# Patient Record
Sex: FEMALE | URBAN - METROPOLITAN AREA
[De-identification: names, ages, dates, MRNs, and addresses within clinical notes are randomized per-mention and may not be internally consistent; named-entity substitution may affect disease eponyms.]

---

## 2017-08-17 ENCOUNTER — HOSPITAL ENCOUNTER (OUTPATIENT)
Dept: GENERAL RADIOLOGY | Age: 37
Discharge: HOME | End: 2017-08-17
Payer: COMMERCIAL

## 2017-08-17 DIAGNOSIS — J20.9 ACUTE BRONCHITIS: ICD-10-CM

## 2017-08-17 PROCEDURE — 71020 XR CHEST PA LAT: CPT

## 2023-10-31 ENCOUNTER — APPOINTMENT (OUTPATIENT)
Dept: SLEEP MEDICINE | Facility: CLINIC | Age: 43
End: 2023-10-31

## 2024-01-11 ENCOUNTER — LAB (OUTPATIENT)
Dept: LAB | Facility: LAB | Age: 44
End: 2024-01-11
Payer: COMMERCIAL

## 2024-01-11 ENCOUNTER — OFFICE VISIT (OUTPATIENT)
Dept: SLEEP MEDICINE | Facility: CLINIC | Age: 44
End: 2024-01-11
Payer: COMMERCIAL

## 2024-01-11 VITALS
TEMPERATURE: 98.1 F | HEIGHT: 64 IN | OXYGEN SATURATION: 99 % | BODY MASS INDEX: 21.17 KG/M2 | DIASTOLIC BLOOD PRESSURE: 71 MMHG | SYSTOLIC BLOOD PRESSURE: 104 MMHG | RESPIRATION RATE: 16 BRPM | HEART RATE: 76 BPM | WEIGHT: 124 LBS

## 2024-01-11 DIAGNOSIS — Z79.899 MEDICATION MANAGEMENT: Primary | ICD-10-CM

## 2024-01-11 DIAGNOSIS — J30.2 SEASONAL ALLERGIES: ICD-10-CM

## 2024-01-11 DIAGNOSIS — G47.419 NARCOLEPSY WITHOUT CATAPLEXY (HHS-HCC): ICD-10-CM

## 2024-01-11 DIAGNOSIS — Z79.899 MEDICATION MANAGEMENT: ICD-10-CM

## 2024-01-11 DIAGNOSIS — G47.33 OBSTRUCTIVE SLEEP APNEA: ICD-10-CM

## 2024-01-11 LAB
AMPHETAMINES UR QL SCN: NORMAL
BARBITURATES UR QL SCN: NORMAL
BENZODIAZ UR QL SCN: NORMAL
BZE UR QL SCN: NORMAL
CANNABINOIDS UR QL SCN: NORMAL
FENTANYL+NORFENTANYL UR QL SCN: NORMAL
OPIATES UR QL SCN: NORMAL
OXYCODONE+OXYMORPHONE UR QL SCN: NORMAL
PCP UR QL SCN: NORMAL

## 2024-01-11 PROCEDURE — 1036F TOBACCO NON-USER: CPT | Performed by: INTERNAL MEDICINE

## 2024-01-11 PROCEDURE — 80307 DRUG TEST PRSMV CHEM ANLYZR: CPT

## 2024-01-11 PROCEDURE — 99204 OFFICE O/P NEW MOD 45 MIN: CPT | Performed by: INTERNAL MEDICINE

## 2024-01-11 PROCEDURE — 99214 OFFICE O/P EST MOD 30 MIN: CPT | Performed by: INTERNAL MEDICINE

## 2024-01-11 RX ORDER — SODIUM OXYBATE 0.5 G/ML
500 SOLUTION ORAL
COMMUNITY

## 2024-01-11 RX ORDER — CYCLOSPORINE 0.5 MG/ML
1 EMULSION OPHTHALMIC 2 TIMES DAILY
COMMUNITY
Start: 2023-05-04

## 2024-01-11 ASSESSMENT — SLEEP AND FATIGUE QUESTIONNAIRES
HOW LIKELY ARE YOU TO NOD OFF OR FALL ASLEEP WHILE SITTING QUIETLY AFTER LUNCH WITHOUT ALCOHOL: SLIGHT CHANCE OF DOZING
HOW LIKELY ARE YOU TO NOD OFF OR FALL ASLEEP WHEN YOU ARE A PASSENGER IN A CAR FOR AN HOUR WITHOUT A BREAK: SLIGHT CHANCE OF DOZING
SITING INACTIVE IN A PUBLIC PLACE LIKE A CLASS ROOM OR A MOVIE THEATER: SLIGHT CHANCE OF DOZING
WAKING_TOO_EARLY: MILD
SLEEP_PROBLEM_NOTICEABLE_TO_OTHERS: NOT AT ALL NOTICEABLE
HOW LIKELY ARE YOU TO NOD OFF OR FALL ASLEEP WHILE WATCHING TV: WOULD NEVER DOZE
HOW LIKELY ARE YOU TO NOD OFF OR FALL ASLEEP WHILE SITTING AND TALKING TO SOMEONE: WOULD NEVER DOZE
HOW LIKELY ARE YOU TO NOD OFF OR FALL ASLEEP WHILE LYING DOWN TO REST IN THE AFTERNOON WHEN CIRCUMSTANCES PERMIT: MODERATE CHANCE OF DOZING
HOW LIKELY ARE YOU TO NOD OFF OR FALL ASLEEP WHILE SITTING AND READING: SLIGHT CHANCE OF DOZING
SATISFACTION_WITH_CURRENT_SLEEP_PATTERN: SATISFIED
WORRIED_DISTRESSED_DUE_TO_SLEEP: NOT AT ALL NOTICEABLE
ESS-CHAD TOTAL SCORE: 6
HOW LIKELY ARE YOU TO NOD OFF OR FALL ASLEEP IN A CAR, WHILE STOPPED FOR A FEW MINUTES IN TRAFFIC: WOULD NEVER DOZE
SLEEP_PROBLEM_INTERFERES_DAILY_ACTIVITIES: A LITTLE
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
## 2024-01-11 NOTE — PATIENT INSTRUCTIONS
"Thank you for coming to the [facility] today! Your sleep medicine provider today was: Eladia Conner MD Below is a summary of your treatment plan, patient education, other important information, and our contact numbers.      TREATMENT PLAN     - Refer to SLP ([facility]) for myofunctional therapy  - Continue Xyrem 9 g daily and bring CPAP in 2 weeks so I can check baseline settings and tweak machine settings .  - Please read the \"Patient Education\" section below for more detailed information. Try implementing tips, reminders, strategies, and supportive management.   - If not yet done, please sign up for [facility] to make a future schedule, send prescription requests, or send messages.    Follow-up Appointment:   Follow-up in 3 months    PATIENT EDUCATION     Obstructive Sleep Apnea (ROSA) is a sleep disorder where your upper airway muscles relax during sleep and the airway intermittently and repetitively narrows and collapses leading to partially blocked airway (hypopnea) or completely blocked airway (apnea) which, in turn, can disrupt breathing in sleep, lower oxygen levels while you sleep and cause night time wakings. Because both apnea and hypopnea may cause higher carbon dioxide or low oxygen levels, untreated ROSA can lead to heart arrhythmia, elevation of blood pressure, and make it harder for the body to consolidate memory and facilitate metabolism (leading to higher blood sugars at night). Frequent partial arousals occur during sleep resulting in sleep deprivation and daytime sleepiness. ROSA is associated with an increased risk of cardiovascular disease, stroke, hypertension, and insulin resistance. Moreover, untreated ROSA with excessive daytime sleepiness can increase the risk of motor vehicular accidents.    Some conservative strategies for ROSA regardless of ROSA severity are:   Positional therapy - Avoid sleeping on your back.   Healthy diet and regular exercise to optimize " weight is highly encouraged.   Avoid alcohol late in the evening and sedative-hypnotics as these substances can make sleep apnea worse.   Improve breathing through the nose with intranasal steroid spray, saline rinse, or antihistamines    Safety: Avoid driving vehicle and operating heavy equipment while sleepy. Drowsy driving may lead to life-threatening motor vehicle accidents. A person driving while sleepy is 5 times more likely to have an accident. If you feel sleepy, pull over and take a short power nap (sleep for less than 30 minutes). Otherwise, ask somebody to drive you.    Treatment options for sleep apnea include weight management, positional therapy, Positive Airway Therapy (PAP) therapy, oral appliance therapy, hypoglossal nerve stimulator (Inspire) and select airway surgeries.    How to use nasal sprays properly: If you have nasal congestion or allergies, improving your breathing through the nose is critical for treating sleep apnea and improving your sleep. Hence, intranasal steroid spray like Flonase or Nasocort (generic name is Fluticasone) might help. In some patients with sleep apnea, using intranasal steroid spray allowed them to tolerate CPAP mask better.     Intranasal steroids are usually prescribed as 2 sprays per nostril 1 hour before bedtime. If you administer intranasal steroids too close to bedtime, it might not work as well.  If you have nasal congestion or allergies during day despite using Flonase, try adding Azelastine nasal spray 2 sprays per nostril in the morning. Alternatively, can consider adding over-the-counter non-sedating antihistamine medications.     However, if you have severe nasal congestion or allergies despite using both nasal sprays, consider seeing an allergy specialist to confirm which substance you are sensitive to and also get definitive treatment which may be in the form of injections, oral pills, different kind of nose sprays, and/or a combination of everything  said.     Below are instructions on how to use intranasal steroid spray properly:  Sit up or stand up with your face down.  Shake the spray bottle and insert its tip in one nostril.  Point the spray towards your ear, and not towards the middle of your nose. Pointing the tip upward and in the middle of the nose can lead to discomfort and irritation of nasal mucosa which can lead to nose bleeding or coughing up tinges of blood.  Squeeze the spray bottle and administer the recommended amount of spray.   Don't sniff when you spray. Remove the spray bottle.  Pinch your nose and hold it for a count of 10.   Perform steps 1-6 on the other nostril.  Narcolepsy is a chronic neurologic disorder characterized by a decreased ability to regulate sleep-wake cycles. It is characterized by severe and persistent daytime sleepiness even despite getting enough sleep at night. The main symptom of narcolepsy is excessive daytime sleepiness that leads to an irrepressible need to sleep and daytime lapses into sleep. Other commonly seen symptoms include     Cataplexy is sudden and brief loss of muscle control and tone when experiencing strong intense emotion like laughter, surprise, or anger.   Hypnagogic hallucinations and hypnopompic hallucinations are vivid dream-like hallucinations occurring while falling asleep and upon waking up respectively, oftentimes frightening.   Sleep paralysis is a feeling of not being able to talk or move for a brief period either when falling asleep or just after waking up. Touching the person usually causes the sleep paralysis to disappear, although it usually just ends on its own.   Disturbed nighttime sleep  Automatic behaviors involve continuing a familiar, routine, or boring task without being fully aware or later remembering doing it. A person is actually asleep during the activity.   Still, other symptoms include intense fatigue with continual lack of energy, depression, poor concentration and memory,  school failure, acting out dreams, or sleep eating.    Although it affects 1 in every 2000 people, it is often misdiagnosed as psychiatric disorder (depression, ADHD, etc), and can lead to reduced sleep quality, reduced quality of life, and delays in treatment. It affects males and females equally and usually develops during adolescence with most people having first symptoms between age 15 to 30.  However, symptoms of narcolepsy can occur at any age. Based on research evidence, narcolepsy is most commonly caused by decreased amount of brain chemicals called Hypocretin (also known as orexin) which, in turn, is due to loss of hypocretin-producing cells in the brain by autoimmune process.     Treatment of narcolepsy involves medications, lifestyle changes, and educating other people.     Medications: One or more medications are usually prescribed to control the symptoms of narcolepsy. The excessive daytime sleepiness is treated with stimulant while cataplexy is treated with anti-depressants.     Lifestyle changes  Try to plan and schedule 2-3 brief naps (~10-15 minutes) in strategic times during the day to control daytime sleepiness and reduce the number of unplanned sleep attacks. Inform teachers or work supervisors about the condition and hopefully, nap accommodations can be fulfilled.   Avoid alcohol and sedative-hypnotic use as these substances may exacerbate sleepiness.   Avoid caffeine in late afternoon or early evening so that sleep at night is not disturbed.   Maintain and follow a strict and regular sleep-wake schedule that ensures adequate sleep.   Practice good sleep hygiene and shift work hygiene, if the latter is applicable  Exposure self to bright light in daytime as bright light can be STIMULATING.  Exercise daily in daytime as it can be beneficial and STIMULATING. Increase physical activity by getting up and walking around often to reduce urge to sleep. Avoid boring or repetitive tasks.   Avoid driving  vehicle or operating heavy machinery when sleepy until daytime sleepiness is well-treated. A person driving while sleepy is 5 times more likely to have an accident. If you feel sleepy, pull over and take a short power nap (sleep for less than 30 minutes). Otherwise, ask somebody to drive you.    Narcolepsy itself is not deadly but it may be dangerous if episodes occur during driving, operating machinery, or similar activities. Possible issues and complications with narcolepsy include difficulty with social activities, injuries and accidents if sleep attacks occur during the activity, and side effects of medications used to treat the disorder.    You can also go to the following EDUCATION WEBSITES for further information:   American Academy of Sleep Medicine http://sleepeducation.org  National Sleep Foundation: https://sleepfoundation.org  American Sleep Apnea Association: https://www.sleepapnea.org (for patients with sleep apnea)  Narcolepsy Network: https://www.narcolepsynetwork.org (for patients with narcolepsy)  [employer]: https://www.wakeupNewzstandcolepsy.org (for patients with narcolepsy)  Hypersomnia Foundation: https://www.hypersomniafoundation.org (for patients with idiopathic hypersomnia)  RLS foundation: https://www.rls.org (for patients with restless leg syndrome)    IMPORTANT INFORMATION     Call 911 for medical emergencies.  Our offices are generally open from Monday-Friday, 8 am - 5 pm.   There are no supporting services by either the sleep doctors or their staff on weekends and Holidays, or after 5 PM on weekdays.   If you need to get in touch with me, you may either call my office number or you can use [employer].  If a referral for a test, for CPAP, or for another specialist was made, and you have not heard about scheduling this within a week, please call scheduling at [phone]).  If you are unable to make your appointment for clinic or an overnight study, kindly call the office or  sleep testing center at least 48 hours in advance to cancel and reschedule.  If you are on CPAP, please bring your device's card and/or the device to each clinic appointment.   In case of problems with PAP machine or mask interface, please contact your DME (Durable Medical Equipment) company first. DME is the company who provides you the machine and/or PAP supplies.       PRESCRIPTIONS     We require 7 days advanced notice for prescription refills. If we do not receive the request in this time, we cannot guarantee that your medication will be refilled in time.    IMPORTANT PHONE NUMBERS     Sleep Medicine Clinic Fax: [phone]  Appointments (for Pediatric Sleep Clinic): [phone] - option 1  Appointments (for Adult Sleep Clinic): [phone] - option 2  Appointments (For Sleep Studies): [phone] - option 3  Behavioral Sleep Medicine: [phone]  Sleep Surgery: [phone]  Nutrition Service: [phone]  Weight management clinics with endocrinology: [phone]  Bariatric Services: [phone] (includes weight loss medications and weight loss surgery)  [facility]: [phone] (offers holistic approaches to weight management)  ENT (Otolaryngology): [phone]  Headache Clinic (Neurology): [phone]  Neurology: [phone]  Psychiatry: [phone]  Pulmonary Function Testing (PFT) Center: [phone]  Pulmonary Medicine: [phone]  [employer] (DME): [phone]      OUR SLEEP TESTING LOCATIONS     Our team will contact you to schedule your sleep study, however, you can contact us as follow:  Main Phone Line (scheduling only): [phone] ([phone]), option 3  Adult and Pediatric Locations  [facility] (6 years and older): [facility] - 4th floor ([address] [place] [address]) After hours line: [phone]  [facility] (Main campus: All ages): [facility], 6th floor. After hours  "line: [phone]   [place] (5 years and older; younger considered on case-by-case basis): [address]; [address], [address]. Scheduling  After hours line: [phone]   [place] (6 years and older): [address]; [address]; [address]   [place] (6 years and older): [address] [facility], [address]  After hours line: [phone]   [place] (13 years and older) in [place]: [address], 2nd floor  After hours line: [phone]   [place] (13 year and older): [address]  After hours line: [phone]     Adult Only Locations:   [place] (18 years and older): [address] [facility], 2nd floor   [place] (18 years and older): [address] [facility]; 4th floor  After hours line: [phone]  [facility] (18 years and older) at [place]: [id number] [place] [address]  After hours line: [phone]     CONTACTING YOUR SLEEP MEDICINE PROVIDER AND SLEEP TEAM      For issues with your machine or mask interface, please call your DME provider first. DME stands for durable medical company. DME is the company who provides you the machine and/or PAP supplies / accessories.   To schedule, cancel, or reschedule SLEEP STUDY APPOINTMENTS, please call the Main Phone Line at [phone] - option 3.   To schedule, cancel, or reschedule CLINIC APPOINTMENTS, you can do it in \"[employer]\", call [phone] to speak with my  (Cherelle Greenfield), or call the Main Phone Line at [phone]) - option 2  For CLINICAL QUESTIONS or MEDICATION REFILLS, please call direct line for Adult Sleep Nurses at [phone].   Lastly, you can also send a message directly to your provider through \"My Chart\", which is the email service through your  Records Account: [email or web address]       Here at [facility], we wish you a restful sleep!    "

## 2024-02-20 ENCOUNTER — EVALUATION (OUTPATIENT)
Dept: SPEECH THERAPY | Facility: CLINIC | Age: 44
End: 2024-02-20
Payer: COMMERCIAL

## 2024-02-20 DIAGNOSIS — G47.33 OBSTRUCTIVE SLEEP APNEA: Primary | ICD-10-CM

## 2024-02-20 PROCEDURE — 92522 EVALUATE SPEECH PRODUCTION: CPT | Mod: GN

## 2024-02-20 PROCEDURE — 92526 ORAL FUNCTION THERAPY: CPT | Mod: GN

## 2024-02-20 ASSESSMENT — PAIN SCALES - GENERAL: PAINLEVEL_OUTOF10: 0 - NO PAIN

## 2024-02-20 ASSESSMENT — PAIN - FUNCTIONAL ASSESSMENT: PAIN_FUNCTIONAL_ASSESSMENT: 0-10

## 2024-02-20 ASSESSMENT — ENCOUNTER SYMPTOMS
OCCASIONAL FEELINGS OF UNSTEADINESS: 0
DEPRESSION: 0
LOSS OF SENSATION IN FEET: 0
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
## 2024-02-20 NOTE — PROGRESS NOTES
Speech-Language Pathology    Speech-Language Pathology ROSA Evaluation and Treatment     Patient Name: Bouchra Burton  MRN: [id number]  : 1980  Today's Date: 24  Start time: 0150  Stop time: 0240  Time calculation (min) : 50 minutes       Current Problem:   1. Obstructive sleep apnea            ASSESSMENT  Impressions:  Patient presents with oromyofunctional deficits contributing to obstructive sleep apnea, upon completion of evaluation this date. Examination of oral mechanism was WFL for symmetry, strength, and range of motion. Because patient has reported poor response to traditional PAP therapy and persistent morning sleepiness, she was referred for alternative therapy involving oropharyngeal exercises and respiratory muscle strength training (RMST). Exercise program was introduced this date. Soft palate, tongue, and face/jaw exercises were addressed and practiced with patient who was able to perform exercises independently. Patient was provided with purchase information for the Breather device for RMST portion of the protocol. Patient will F/U as needed.        PLAN  Recommendations:     Recommend implementation of oropharyngeal exercises for ROSA.     Recommended frequency/duration:  Skilled SLP services recommended: Yes  Frequency: 1x/2-3 weeks  Duration: 3 months  Strengths: Cognition, Medical background, Motivation, Family/caregiver support  Barriers to participation in tx: N/A     Goals (start date 2024):  By discharge:  LTG:  Patient will demonstrate improvement on the [place] Sleepiness Scale and [place] Sleep Quality Index following treatment, as a measure of improvement in sleep-related quality of life.              Status: Goal initiated this date    STGs:  1. Patient will complete recommended oropharyngeal exercises at recommended frequency (10 repetitions, 2x/day), as observed in follow-up sessions and per patient report.  > Status: Goal initiated this date  > Progress this date:  Treatment protocol introduced today. SLP instructed patient on oropharyngeal exercises for soft palate, tongue, and face/jaw. X3 exercises were practiced with patient demonstrating accurate performance independently.    2. Patient will complete respiratory muscle strength training (RMST) at recommended frequency (2 sets of 10 breaths, 2x/day), as observed in follow-up sessions and per patient report.  > Status: Goal initiated this date  > Progress this date: Treatment protocol introduced today. SLP provided patient with purchase information for the Breather device to perform RMST (IMST/EMST). When Breather is obtained, device will be calibrated at patient's first F/U visit.        SUBJECTIVE    General Information:  Referring Provider: Dr. Eladia Conner  Number of Authorized Treatments: 20 visits/year (MMO)  Total Number of Visits : 1    Pain: General Information:  Pain Assessment: 0-10  Pain Score: 0 - No pain      Chief complaint:   Patient was referred for outpatient Speech Therapy this date to be assessed for oromyofunctional treatments of obstructive sleep apnea (ROSA), upon referral from Sleep Medicine physician, Dr. Eladia Conner. Patient has a h/o sleep disorder, including narcolepsy, sleep attacks, sleep paralysis, and hypnopompic hallucinations. Patient was originally under the care of a sleep doctor through [facility] where she took the PSG-MSLT test which showed MSL 0.4 mins and 5 SOREMPs out of 5 naps. Patient was diagnosed with mild-moderate ROSA in 2020. Patient tried use of the CPAP but only used it for 3-6 months and did not tolerate it. At most recent sleep medicine visit, patient expressed interest in PAP alternatives.     Patient denies significant daytime sleepiness or sleep disturbances, instead reporting that she has a difficult time waking up, feeling sleepy/foggy for the first hour of the day. She reported emergence of occasional snoring and daytime sleepiness. She expressed concern that she is  beginning to cap out on the effects of her Xyrem, the only medication that has been effective for management of her narcolepsy. Patient prefers her current dosage of Xyrem and does not want to pursue use of long-lasting dosage in the case it makes it more difficult for her to wake up in the morning. Patient denies presence of dysphagia.       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[place])     Orientation: Ox4  Ability to follow functional commands: WFL  Nutritional status: Appears well-nourished/no concerns  Patient positioning: Upright in chair          OBJECTIVE     [place] Sleep Quality Index:  Bedtime: 11PM  Time to fall asleep (minutes): 20  Wake time: 7:30AM  Hours of sleep/night: 7-8  Past month (1-2x/week or greater):  Wake up in the middle of the night or early morning  Have to get up to use the bathroom  Sleep quality over the last month: Very good  [place] Sleepiness Scale: 5/24  Eating Assessment Tool 10:  0/40  3 Ounce Water Challenge: WFL     Treatment/Education:  Results and recommendations reviewed with the patient.  Education consisted of written instruction, verbal instruction, and demonstration by the clinician. Patient demonstrated comprehension of instructions re: exercise routine independently.      Treatment provided: Yes

## 2024-02-22 ENCOUNTER — TELEPHONE (OUTPATIENT)
Dept: SLEEP MEDICINE | Facility: HOSPITAL | Age: 44
End: 2024-02-22
Payer: COMMERCIAL

## 2024-02-22 NOTE — TELEPHONE ENCOUNTER
Above patient called and stated pharmacy will not fill her remaining Rx from her previous physician because she changed over to Dr. Conner. Message sent to Dr. Conner to let her know that because she is new to Dr. Conner they require a New Paper Rx be faxed to the Xyrem and Xywav REMS program. I  emailed Dr. Conner the form to review and sign so it can be fax in to the number listed on the form.

## 2024-03-07 ENCOUNTER — TREATMENT (OUTPATIENT)
Dept: SPEECH THERAPY | Facility: CLINIC | Age: 44
End: 2024-03-07
Payer: COMMERCIAL

## 2024-03-07 DIAGNOSIS — G47.33 OBSTRUCTIVE SLEEP APNEA: ICD-10-CM

## 2024-03-07 PROCEDURE — 92526 ORAL FUNCTION THERAPY: CPT | Mod: GN

## 2024-03-07 ASSESSMENT — PAIN SCALES - GENERAL: PAINLEVEL_OUTOF10: 0 - NO PAIN

## 2024-03-07 ASSESSMENT — PAIN - FUNCTIONAL ASSESSMENT: PAIN_FUNCTIONAL_ASSESSMENT: 0-10

## 2024-03-07 NOTE — PROGRESS NOTES
Speech-Language Pathology    SLP Adult Outpatient Speech-Language Pathology Treatment     Patient Name: Bouchra Burton  MRN: [id number]  Today's Date: 3/7/2024  Time Calculation  Start Time: 0845  Stop Time: 0920  Time Calculation (min): 35 min      Current Problem:   1. Obstructive sleep apnea  Follow Up In Speech Therapy            SLP Assessment:  SLP TX Intervention Outcome: Making Progress Towards Goals  Treatment Provided: Yes, see Objective for details  Treatment Tolerance: Patient tolerated treatment well       Plan:  SLP TX Plan: Continue Plan of Care  SLP Plan: Skilled SLP  SLP Frequency: 1x/2-3 weeks  Discussed POC: Patient  Discussed Risks/Benefits: Yes, Patient  Patient/Caregiver Agreeable: Yes      Subjective   Patient well, arriving to and attending treatment independently. This is patient's first F/U visit since her initial evaluation on 2/20/2024. Since last visit, patient obtained a Breather device for use in therapy.       General Visit Information:  Number of Authorized Treatments : 20 visits/year (MMO)  Total Number of Visits : 2      Pain Assessment:  Pain Assessment: 0-10  Pain Score: 0 - No pain      Objective     Goals (start date 2/20/2024):  By discharge:  LTG:  Patient will demonstrate improvement on the [place] Sleepiness Scale and [place] Sleep Quality Index following treatment, as a measure of improvement in sleep-related quality of life.    STGs:  1. Patient will complete recommended oropharyngeal exercises at recommended frequency (10 repetitions, 2x/day), as observed in follow-up sessions and per patient report.  > Progress this date: SLP reviewed oropharyngeal exercises for soft palate, tongue, and face/jaw. Patient reported accurate and consistent practice of exercises as part of HEP.    2. Patient will complete respiratory muscle strength training (RMST) at recommended frequency (2 sets of 10 breaths, 2x/day), as observed in follow-up sessions and per patient report.  >  Progress this date: Patient brought the Breather device with her to today's session. Device was calibrated for both IMST and EMST. Patient performed 10 breaths at 60% maximum effort at level 6/6 for IMST and at level 3/5 for EMST. HEP reviewed, and consistent practice encouraged for best results.      Outpatient Education:  Adult Outpatient Education  Individual(s) Educated: Patient  Written Education : ROSA exercises re: IMST/EMST via Breather; HEP  Verbal Education : ROSA exercises re: IMST/EMST via Breather; HEP  Risk and Benefits Discussed with Patient/Caregiver/Other: yes  Patient/Caregiver Demonstrated Understanding: yes  Plan of Care Discussed and Agreed Upon: yes  Patient Response to Education: Patient/Caregiver Verbalized Understanding of Information, Patient/Caregiver Asked Appropriate Questions, Patient/Caregiver Performed Return Demonstration of Exercises/Activities

## 2024-05-01 ENCOUNTER — TREATMENT (OUTPATIENT)
Dept: SPEECH THERAPY | Facility: CLINIC | Age: 44
End: 2024-05-01
Payer: COMMERCIAL

## 2024-05-01 DIAGNOSIS — G47.33 OBSTRUCTIVE SLEEP APNEA: ICD-10-CM

## 2024-05-01 PROCEDURE — 92526 ORAL FUNCTION THERAPY: CPT | Mod: GN

## 2024-05-01 NOTE — PROGRESS NOTES
Speech-Language Pathology    SLP Adult Outpatient Speech-Language Pathology Re-Assessment / Discharge Summary     Patient Name: Bouchra Burton  MRN: [id number]  Today's Date: 5/1/2024  Time Calculation  Start Time: 0930  Stop Time: 1005  Time Calculation (min): 35 min      Current Problem:   1. Obstructive sleep apnea  Follow Up In Speech Therapy          SLP Assessment:  SLP TX Intervention Outcome: Goals Progressed/Met  Treatment Provided: Yes, see Objective for details  Treatment Tolerance: Patient tolerated treatment well      Plan:  SLP Plan: Discharge ST; no ongoing skilled ST needs identified; patient is independent with HEP   Discussed POC: Patient  Discussed Risks/Benefits: Yes, Patient  Patient/Caregiver Agreeable: Yes      Subjective   Patient well, arriving to and attending treatment independently. This is patient's first F/U visit since 3/7/2024. Patient reported generally good adherence with HEP, performing straw exercise least simply d/t l/o straw present on most occasions to complete practice (patient is frequently traveling).       General Visit Information:  Number of Authorized Treatments : 20 visits/year (MMO)  Total Number of Visits : 3      Pain Assessment:  Pain Assessment: 0-10  Pain Score: 0 - No pain      Objective     Goals (start date 2/20/2024):  By discharge:  LTG:  Patient will demonstrate improvement on the [place] Sleepiness Scale and [place] Sleep Quality Index following treatment, as a measure of improvement in sleep-related quality of life.    STGs:  1. Patient will complete recommended oropharyngeal exercises at recommended frequency (10 repetitions, 2x/day), as observed in follow-up sessions and per patient report.  > Progress this date: Patient reported accurate and consistent practice of exercises as part of HEP. Patient indicated practice of straw exercise was performed the least d/t l/o straw to engage in exercise (patient's travels frequently).  > Status: GOAL MET.    2.  Patient will complete respiratory muscle strength training (RMST) at recommended frequency (2 sets of 10 breaths, 2x/day), as observed in follow-up sessions and per patient report.  > Progress this date: Patient brought the Breather device with her to today's session. At last visit, patient was calibrated to the device at level 6/6 for IMST and at level 3/5 for EMST. Patient reported practice has gone well, and she has engaged in consistent practice of exercises as part of HEP.  > Status: GOAL MET.      Re-Assessment:    [place] Sleep Quality Index:  Bedtime: 11PM (prev. 11PM)  Time to fall asleep (minutes): 10-30 (prev. 20)  Wake time: 7:15AM (prev. 7:30AM)  Hours of sleep/night: 7 1/2-8 1/2 (prev. 7-8)  Past month (1-2x/week or greater):  Wake up in the middle of the night or early morning (prev.)  Have to get up to use the bathroom (prev.)  Cannot get to sleep within 30 minutes (new)  Sleep quality over the last month: Fairly good (prev. Very good)  [place] Sleepiness Scale: 7/24 (prev. 5/24) *Patient cited reason for increase in score as reading the questions differently at this visit vs evaluation, noting that questions did not ask if patient did/did not experience each scenario but asked her what the likelihood was that each experience would occur    *Overall, patient reported no significant change in overall s/s. She continues to be foggy/groggy for approx 1 hour after waking up for the day, however, feels appropriately alert following that first hour of her day. SLP advised patient to monitor the duration and consistency of those s/s in the future, and if occurrences increased in duration and frequency, to reconnect with her sleep medicine physician to discuss s/s.      Outpatient Education:  Adult Outpatient Education  Individual(s) Educated: Patient  Verbal Education : ROSA exercises re: IMST/EMST via Breather; HEP; discharge planning  Risk and Benefits Discussed with Patient/Caregiver/Other:  yes  Patient/Caregiver Demonstrated Understanding: yes  Plan of Care Discussed and Agreed Upon: yes  Patient Response to Education: Patient/Caregiver Verbalized Understanding of Information, Patient/Caregiver Asked Appropriate Questions, Patient/Caregiver Performed Return Demonstration of Exercises/Activities

## 2024-05-02 ENCOUNTER — OFFICE VISIT (OUTPATIENT)
Dept: SLEEP MEDICINE | Facility: CLINIC | Age: 44
End: 2024-05-02
Payer: COMMERCIAL

## 2024-05-02 DIAGNOSIS — G47.419 NARCOLEPSY WITHOUT CATAPLEXY (HHS-HCC): Primary | ICD-10-CM

## 2024-05-02 DIAGNOSIS — Z79.899 MEDICATION MANAGEMENT: ICD-10-CM

## 2024-05-02 DIAGNOSIS — G47.33 OBSTRUCTIVE SLEEP APNEA: ICD-10-CM

## 2024-05-02 PROCEDURE — G2211 COMPLEX E/M VISIT ADD ON: HCPCS | Performed by: INTERNAL MEDICINE

## 2024-05-02 PROCEDURE — 99214 OFFICE O/P EST MOD 30 MIN: CPT | Performed by: INTERNAL MEDICINE

## 2024-05-02 ASSESSMENT — SLEEP AND FATIGUE QUESTIONNAIRES
HOW LIKELY ARE YOU TO NOD OFF OR FALL ASLEEP IN A CAR, WHILE STOPPED FOR A FEW MINUTES IN TRAFFIC: WOULD NEVER DOZE
HOW LIKELY ARE YOU TO NOD OFF OR FALL ASLEEP WHEN YOU ARE A PASSENGER IN A CAR FOR AN HOUR WITHOUT A BREAK: SLIGHT CHANCE OF DOZING
HOW LIKELY ARE YOU TO NOD OFF OR FALL ASLEEP WHILE SITTING AND TALKING TO SOMEONE: WOULD NEVER DOZE
HOW LIKELY ARE YOU TO NOD OFF OR FALL ASLEEP WHILE SITTING AND READING: SLIGHT CHANCE OF DOZING
HOW LIKELY ARE YOU TO NOD OFF OR FALL ASLEEP WHILE SITTING QUIETLY AFTER LUNCH WITHOUT ALCOHOL: SLIGHT CHANCE OF DOZING
SITING INACTIVE IN A PUBLIC PLACE LIKE A CLASS ROOM OR A MOVIE THEATER: WOULD NEVER DOZE
HOW LIKELY ARE YOU TO NOD OFF OR FALL ASLEEP WHILE LYING DOWN TO REST IN THE AFTERNOON WHEN CIRCUMSTANCES PERMIT: MODERATE CHANCE OF DOZING
ESS-CHAD TOTAL SCORE: 5
HOW LIKELY ARE YOU TO NOD OFF OR FALL ASLEEP WHILE WATCHING TV: WOULD NEVER DOZE

## 2024-05-02 NOTE — PROGRESS NOTES
[facility] SLEEP MEDICINE CLINIC  FOLLOW-UP VISIT NOTE      Virtual or Telephone Consent  An interactive audio and video telecommunication system which permits real time communications between the patient (at the originating site) and provider (at the distant site) was utilized to provide this telehealth service.   The patient was informed about the telehealth clinical encounter including benefits to avoiding travel, limitations of the assessment, and billing for the service. In-office care may be recommended if needed. Telehealth sessions are not being recorded and personal health information is protected. All questions were answered and verbal consent from the patient (or guardian) was obtained to proceed.     PCP: No primary care provider on file.    HISTORY OF PRESENT ILLNESS     CONTROLLED SUBSTANCE HPI  Current medications: Xyrem  Any side effects: from current meds: No     OARRS:   I have personally reviewed the OARRS report for Bouchra Burton. I have considered the risks of abuse, dependence, addiction and diversion and I believe that it is clinically appropriate for Bouchra Burton to be prescribed this medication    Is the patient prescribed a combination of benzodiazepine and opioid? No    Date of last urine drug screen: 1/11/2024   Results of last urine drug screen: Results are as expected.   Ordered urine drug screen today: No     Controlled substance agreement:   Date of the last agreement: 1/11/2024  Renewed controlled substance agreement today: No   Reviewed Controlled Substance Agreement including but not limited to the benefits, risks, and alternatives to treatment with a Controlled Substance medication(s).    Sleep Aids:   What is the patient's goal of therapy? Able to sleep thru the night and able to stay awake in daytime   Is this being achieved with current treatment? yes    Activities of Daily Living:   Is your overall impression that this patient is benefiting (symptom reduction  outweighs side effects) from sleep aid therapy? Yes     1. Physical Functioning: Better  2. Family Relationship: Better  3. Social Relationship: Better  4. Mood: Better  5. Sleep Patterns: Better  6. Overall Function: Better      Patient ID: Bouchra Burton is a 43 y.o. 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     Patient is here alone today.  To review, patient's medical history is notable for Gilbert syndrome, narcolepsy without cataplexy, and now with moderate ROSA.    Interval History  Patient was last seen in 1/11/2024. Plan was to continue Xyrem and try myofunctional therapy for ROSA  Since last visit, patient was able to stay awake and be alert in daytime.   Still denies sleep paralysis and hypnagogic / hypnopompic hallucinations when she is taking Xyrem. Never had cataplexy.  Doing well on Xyrem and denies side effects from medication.     ROSA Follow-up:  Since last visit, patient tried myofunctional therapy which she thinks was not effective. She states that non-supine sleep seemed to have improved her snoring and ROSA symptoms    RLS Follow-up:   Denies    SLEEP STUDY HISTORY (personally reviewed raw data such as interpretation report, data sheet, hypnogram, and titration table if available and applicable)  PSG 2/16/2004: AHI 4.9, REM AHI 10, SpO2 lobito 91%, sleep latency 9.5 minutes, REM latency 100.5 mins, sleep efficiency 95.5%, PLM index 1.1, no snoring, BMI 23  MSLT 2/17/2004: MSL 0.4 mins, 5 SOREMPs out of 5 naps   PSG 2/12/2020: AHI3% 16.6, AHI4% 7.4, SpO2 lobito 89%    SLEEP-WAKE SCHEDULE  Bedtime:  11 PM daily  Subjective sleep latency: 5-30 minutes  Number of awakenings: She wakes up once at 3:30 AM to take 2nd dose Xyrem.  Falls back asleep right away  Final wake time:  7:15 AM on weekdays, 8:30 AM on weekends  Out of bed time:  7:20 AM on weekdays, 9-10 AM on weekends  Shift work: No  Naps: no napping since on  "Xyrem  Average sleep duration (excluding naps): 7-9 hours    SLEEP ENVIRONMENT  Sleep location: bed  Sleep status: sleeps with   Preferred sleep position: back and side     SLEEP HABITS   Smoking: no  ETOH: no  Marijuana: no  Caffeine: may sometimes drink herbal tea  Sleep aids: no    WEIGHT: stable    Claustrophobia: No    REVIEW OF SYSTEMS     All other systems have been reviewed and are negative.    Active Problems, Allergy List, Medication List, and PMH/PSH/FH/Social Hx have been reviewed and reconciled in chart. No significant changes unless documented in the pertinent chart section. Updates made when necessary.     PHYSICAL EXAM     VITAL SIGNS: There were no vitals taken for this visit.    NECK CIRCUMFERENCE: not measured    Today ESS: 5   Last visit ESS: 6  DANIELA: 3      RESULTS/DATA     No results found for: \"IRON\", \"TRANSFERRIN\", \"IRONSAT\", \"TIBC\", \"FERRITIN\"    No results found for: \"CO2\"    ASSESSMENT/PLAN     Bouchra Burton is a 43 y.o. female who returns in [facility] for the following problems:    NARCOLEPSY without CATAPLEXY  - Doing well on Xyrem. Patient denies any side effects.   - OARRS reviewed today which showed no signs of abuse.  - Controlled substance agreement completed last 1/11/2024.  - Urine drug screen completed last 1/11/2024 which showed results as expected.  - Continue Xyrem  - Supportive management:   Plan naps to control daytime sleepiness and reduce the number of unplanned sudden sleep attacks. Schedule a brief nap (10-15 minutes) in strategic times during the day, if possible.   Inform teachers or work supervisors about the condition.   Maintain a regular sleep-wake schedule and practice good sleep hygiene.   Expose self to bright light in daytime.   Exercise daily in daytime as it can beneficial and STIMULATING.   Avoid alcohol and sedative-hypnotic drugs as these substances may exacerbate sleepiness.   Avoid driving vehicle or operating " heavy machinery when sleepy until daytime sleepiness is well-treated. A person driving while sleepy is 5 times more likely to have an accident. If you feel sleepy, pull over and take a short power nap (sleep for less than 30 minutes). Otherwise, ask somebody to drive you.     OBSTRUCTIVE SLEEP APNEA, MODERATE positional (PSG AHI3% 16.6, AHI4% 7.4 )   - Does not feel that myofunctional therapy is helping.   - States that positional therapy actually improved her snoring and ROSA symptoms  - Continue supportive management as follows: Lose weight. Stay off your back when sleeping. Avoid smoking, alcohol, and sedating medications if applicable. Don't drive when sleepy.    SEASONAL ALLERGIES  - Consider fluticasone nasal spray 2 sprays per nostril once daily 1 hour before bedtime.  - If there is inadequate or lack of improvement on the above intranasal steroid spray, consider adding Azelastine nasal spray, 2 sprays per nostril once daily in the morning.   - Alternatively, may add cetirizine or loratadine 10 mg once daily.       All of patient's questions were answered. She verbalizes understanding and agreement with my assessment and plan. Refer to after-visit-summary (AVS) for patient education and if applicable, for more details on troubleshooting issues with PAP usage, proper cleaning instructions of PAP supplies, and usual recommended replacement schedule for each of the PAP supplies.     Follow-up in 6 months.

## 2024-10-22 ENCOUNTER — TELEPHONE (OUTPATIENT)
Dept: SLEEP MEDICINE | Facility: HOSPITAL | Age: 44
End: 2024-10-22
Payer: COMMERCIAL

## 2024-10-22 NOTE — TELEPHONE ENCOUNTER
[facility] pharmacy requested a refill of Xyrem. Called and spoke with pharmacist and patient has 1 refill of Xyrem left. Patient has follow-up scheduled with Dr. Conner on 11/14/2024. Informed pharmacist that we will hold off refilling until her appointment.

## 2024-11-14 ENCOUNTER — OFFICE VISIT (OUTPATIENT)
Dept: SLEEP MEDICINE | Facility: CLINIC | Age: 44
End: 2024-11-14
Payer: COMMERCIAL

## 2024-11-14 VITALS
BODY MASS INDEX: 20.86 KG/M2 | RESPIRATION RATE: 16 BRPM | SYSTOLIC BLOOD PRESSURE: 108 MMHG | HEIGHT: 64 IN | OXYGEN SATURATION: 96 % | TEMPERATURE: 97.8 F | HEART RATE: 86 BPM | DIASTOLIC BLOOD PRESSURE: 78 MMHG | WEIGHT: 122.2 LBS

## 2024-11-14 DIAGNOSIS — Z79.899 MEDICATION MANAGEMENT: ICD-10-CM

## 2024-11-14 DIAGNOSIS — G47.419 NARCOLEPSY WITHOUT CATAPLEXY (HHS-HCC): Primary | ICD-10-CM

## 2024-11-14 PROCEDURE — 99214 OFFICE O/P EST MOD 30 MIN: CPT | Performed by: INTERNAL MEDICINE

## 2024-11-14 PROCEDURE — 3008F BODY MASS INDEX DOCD: CPT | Performed by: INTERNAL MEDICINE

## 2024-11-14 RX ORDER — SPIRONOLACTONE 25 MG/1
25 TABLET ORAL
COMMUNITY
Start: 2024-05-30

## 2024-11-14 ASSESSMENT — SLEEP AND FATIGUE QUESTIONNAIRES
HOW LIKELY ARE YOU TO NOD OFF OR FALL ASLEEP WHILE WATCHING TV: WOULD NEVER DOZE
HOW LIKELY ARE YOU TO NOD OFF OR FALL ASLEEP IN A CAR, WHILE STOPPED FOR A FEW MINUTES IN TRAFFIC: WOULD NEVER DOZE
SITING INACTIVE IN A PUBLIC PLACE LIKE A CLASS ROOM OR A MOVIE THEATER: SLIGHT CHANCE OF DOZING
ESS-CHAD TOTAL SCORE: 6
HOW LIKELY ARE YOU TO NOD OFF OR FALL ASLEEP WHEN YOU ARE A PASSENGER IN A CAR FOR AN HOUR WITHOUT A BREAK: SLIGHT CHANCE OF DOZING
HOW LIKELY ARE YOU TO NOD OFF OR FALL ASLEEP WHILE SITTING AND READING: SLIGHT CHANCE OF DOZING
HOW LIKELY ARE YOU TO NOD OFF OR FALL ASLEEP WHILE SITTING QUIETLY AFTER LUNCH WITHOUT ALCOHOL: SLIGHT CHANCE OF DOZING
HOW LIKELY ARE YOU TO NOD OFF OR FALL ASLEEP WHILE LYING DOWN TO REST IN THE AFTERNOON WHEN CIRCUMSTANCES PERMIT: MODERATE CHANCE OF DOZING
HOW LIKELY ARE YOU TO NOD OFF OR FALL ASLEEP WHILE SITTING AND TALKING TO SOMEONE: WOULD NEVER DOZE

## 2024-11-14 NOTE — PROGRESS NOTES
[facility] SLEEP MEDICINE   FOLLOW-UP VISIT NOTE      PCP: No primary care provider on file.    HISTORY OF PRESENT ILLNESS     CONTROLLED SUBSTANCE HPI  Current medications: Xyrem   Any side effects: from current meds: Yes (sleep enuresis)    OARRS:   I have personally reviewed the OARRS report for Bouchra Burton. I have considered the risks of abuse, dependence, addiction and diversion and I believe that it is clinically appropriate for Bouchra Burton to be prescribed this medication    Is the patient prescribed a combination of benzodiazepine and opioid? Yes, I feel it is clincially indicated to continue the medication and have discussed with the patient risks/benefits/alternatives.    Date of last urine drug screen: 1/11/2024   Results of last urine drug screen: Results are as expected.   Ordered urine drug screen today: No     Controlled substance agreement:   Date of the last agreement: 1/11/2024  Renewed controlled substance agreement today: Yes   Reviewed Controlled Substance Agreement including but not limited to the benefits, risks, and alternatives to treatment with a Controlled Substance medication(s).    Sleep Aids:   What is the patient's goal of therapy? Sleep better at night and stay wake in daytime  Is this being achieved with current treatment? YES    Activities of Daily Living:   Is your overall impression that this patient is benefiting (symptom reduction outweighs side effects) from sleep aid therapy? Yes     1. Physical Functioning: Better  2. Family Relationship: Better  3. Social Relationship: Better  4. Mood: Better  5. Sleep Patterns: Better  6. Overall Function: Better      Patient ID: Bouchra Burton is a 44 y.o. female who presents to a 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     Patient is here alone today.  To review, patient's medical history is notable for Gilbert syndrome, narcolepsy without cataplexy on  Xyrem, and moderate ROSA on positional therapy.     Interval History  Patient was last seen in 5/2/2024. Plan was to continue Xyrem  Since last visit, patient was able to stay awake and be alert in daytime.   Still denies cataplexy, sleep paralysis, and hypnagogic / hypnopompic hallucinations.  Doing well on Xyrem and denies side effects from medication.     ROSA Follow-up:  Declined PAP therapy. Tried myofunctional therapy which does not seem to be effective per patient. However, she states that non-supine sleep seemed to have improved her snoring and ROSA symptoms     RLS Follow-up:   Denies    SLEEP STUDY HISTORY (personally reviewed raw data such as interpretation report, data sheet, hypnogram, and titration table if available and applicable)  PSG 2/16/2004: AHI 4.9, REM AHI 10, SpO2 lobito 91%, sleep latency 9.5 minutes, REM latency 100.5 mins, sleep efficiency 95.5%, PLM index 1.1, no snoring, BMI 23  MSLT 2/17/2004: MSL 0.4 mins, 5 SOREMPs out of 5 naps   PSG 2/12/2020: AHI3% 16.6, AHI4% 7.4, SpO2 lobito 89%    SLEEP-WAKE SCHEDULE  Bedtime:  11 PM daily  Subjective sleep latency: 5-30 minutes  Number of awakenings: She wakes up once at 3:30 AM to take 2nd dose Xyrem.  Falls back asleep right away  Final wake time:  7:15 AM on weekdays, 8:30 AM on weekends  Out of bed time:  7:20 AM on weekdays, 9-10 AM on weekends  Shift work: No  Naps: no napping since on Xyrem  Average sleep duration (excluding naps): 7-9 hours    SLEEP ENVIRONMENT  Sleep location: bed  Sleep status: sleeps with   Preferred sleep position: back and side    SLEEP HABITS   Smoking: no  ETOH: no  Marijuana: no  Caffeine: may sometimes drink herbal tea  Sleep aids: no    WEIGHT: stable    Claustrophobia: No    REVIEW OF SYSTEMS     All other systems have been reviewed and are negative.    ALLERGIES     No Known Allergies    MEDICATIONS     Current Outpatient Medications   Medication Sig Dispense Refill    cycloSPORINE (Restasis) 0.05 %  "ophthalmic emulsion Administer 1 drop into affected eye(s) twice a day.      sodium oxybate 500 mg/mL solution Take 1 mL (500 mg) by mouth.      spironolactone (Aldactone) 25 mg tablet Take 1 tablet (25 mg) by mouth once daily.       No current facility-administered medications for this visit.       Active Problems, Allergy List, Medication List, and PMH/PSH/FH/Social Hx have been reviewed and reconciled in chart. No significant changes unless documented in the pertinent chart section. Updates made when necessary.     PHYSICAL EXAM     VITAL SIGNS: /78   Pulse 86   Temp 36.6 °C (97.8 °F)   Resp 16   Ht 1.626 m (5' 4\")   Wt 55.4 kg (122 lb 3.2 oz)   SpO2 96%   BMI 20.98 kg/m²     NECK CIRCUMFERENCE: not measured    Today ESS: 6   Last visit ESS: 5  DANIELA: 3    Physical Exam  Constitutional: Awake, not in distress  Lungs: Clear to auscultation bilateral, no rales  Heart: Regular rate and rhythm, no murmurs  Skin: Warm, no rash  Neuro: No tremors, moves all extremities  Psych: alert and oriented to time, place, and person    RESULTS/DATA     No results found for: \"IRON\", \"TRANSFERRIN\", \"IRONSAT\", \"TIBC\", \"FERRITIN\"    No results found for: \"CO2\"    ASSESSMENT/PLAN     Bouchra Burton is a 44 y.o. female who returns in [facility] for the following problems:    NARCOLEPSY without CATAPLEXY  - Doing well on Xyrem. Patient denies any side effects.   - OARRS reviewed today which showed no signs of abuse.  - Controlled substance agreement completed last 1/11/2024. Renew agreement once yearly.  - Urine drug screen completed last 1/11/2024 which showed results as expected. Perform urine drug screen at least once yearly.  - Continue Xyrem 4.5 g 2x nightly  - Supportive management:   Plan naps to control daytime sleepiness and reduce the number of unplanned sudden sleep attacks. Schedule a brief nap (10-15 minutes) in strategic times during the day, if possible.   Inform teachers or " work supervisors about the condition.   Maintain a regular sleep-wake schedule and practice good sleep hygiene.   Expose self to bright light in daytime.   Exercise daily in daytime as it can beneficial and STIMULATING.   Avoid alcohol and sedative-hypnotic drugs as these substances may exacerbate sleepiness.   Avoid driving vehicle or operating heavy machinery when sleepy until daytime sleepiness is well-treated. A person driving while sleepy is 5 times more likely to have an accident. If you feel sleepy, pull over and take a short power nap (sleep for less than 30 minutes). Otherwise, ask somebody to drive you.     OBSTRUCTIVE SLEEP APNEA, MODERATE positional (PSG AHI3% 16.6, AHI4% 7.4 )   - Does not feel that myofunctional therapy is helping.   - States that positional therapy actually improved her snoring and ROSA symptoms  - Continue supportive management as follows: Lose weight. Stay off your back when sleeping. Avoid smoking, alcohol, and sedating medications if applicable. Don't drive when sleepy.     SEASONAL ALLERGIES  - Consider fluticasone nasal spray 2 sprays per nostril once daily 1 hour before bedtime.  - If there is inadequate or lack of improvement on the above intranasal steroid spray, consider adding Azelastine nasal spray, 2 sprays per nostril once daily in the morning.   - Alternatively, may add cetirizine or loratadine 10 mg once daily.    All of patient's questions were answered. She verbalizes understanding and agreement with my assessment and plan. Refer to after-visit-summary (AVS) for patient education and if applicable, for more details on troubleshooting issues with PAP usage, proper cleaning instructions of PAP supplies, and usual recommended replacement schedule for each of the PAP supplies.     Follow-up in 6 months.

## 2024-11-15 RX ORDER — SODIUM OXYBATE 0.5 G/ML
SOLUTION ORAL
Qty: 540 ML | Refills: 5 | Status: CANCELLED | OUTPATIENT
Start: 2024-11-15

## 2024-12-31 ENCOUNTER — TELEPHONE (OUTPATIENT)
Dept: SLEEP MEDICINE | Facility: HOSPITAL | Age: 44
End: 2024-12-31
Payer: COMMERCIAL

## 2024-12-31 NOTE — TELEPHONE ENCOUNTER
Attempted to submit Prior Authorization via [employer] [id number] for Sodium Oxybate  500MG/ML solution #540 ml per 30 days. Unable to complete via CoverMyMeds (CoverMyMeds Recommendation  This medication may be excluded from the patient's benefit. For more information, please reach out to [facility] directly at [phone].)    _____________________________________________________________________    Called [facility] at [phone] and spoke with Anita NUNEZ And she was able to look up patient account, per representative medication does not need a PA for this medication but patient needs to call [employer]-Payment Assistance program at [phone] and enroll in the program before the medication can be dispensed.    _____________________________________________________________________    Called [facility] pharmacy at [phone] and spoke patient financial  Loreto and informed her that per [employer] representative medication does not need a PA for this medication but patient needs to call [employer] Assistance Program at [phone] and enroll in the program before the medication can be dispensed. Representative stated they will have their team reach out to the patient.

## 2025-05-12 ENCOUNTER — APPOINTMENT (OUTPATIENT)
Dept: INFECTIOUS DISEASES | Facility: CLINIC | Age: 45
End: 2025-05-12
Payer: COMMERCIAL

## 2025-05-12 VITALS
HEART RATE: 83 BPM | BODY MASS INDEX: 22.02 KG/M2 | SYSTOLIC BLOOD PRESSURE: 107 MMHG | WEIGHT: 129 LBS | TEMPERATURE: 98.7 F | OXYGEN SATURATION: 97 % | DIASTOLIC BLOOD PRESSURE: 68 MMHG | HEIGHT: 64 IN

## 2025-05-12 DIAGNOSIS — Z71.84 COUNSELING FOR TRAVEL: Primary | ICD-10-CM

## 2025-05-12 RX ORDER — ATOVAQUONE AND PROGUANIL HYDROCHLORIDE 250; 100 MG/1; MG/1
1 TABLET, FILM COATED ORAL DAILY
Qty: 26 TABLET | Refills: 0 | Status: SHIPPED | OUTPATIENT
Start: 2025-05-12 | End: 2025-06-07

## 2025-05-12 RX ORDER — CIPROFLOXACIN 500 MG/1
500 TABLET ORAL 2 TIMES DAILY
Qty: 6 TABLET | Refills: 0 | Status: SHIPPED | OUTPATIENT
Start: 2025-05-12 | End: 2025-05-15

## 2025-05-12 NOTE — PROGRESS NOTES
Traveling [place] [facility] [place] [facility] from 6/17-7/03/2025. HepA 5/2002, 3/2003. Tdap 8/2019. YF 3/2017.     Ordered HepA booster and Typhoid ViCPs vaccines.    Prescribed malarone (26 doses) and cipro for Traveler's diarrhea.    Discussed food, water, and insect precautions.

## 2025-05-12 NOTE — PATIENT INSTRUCTIONS
You were seen today for your upcoming trip.    For your country specific issues, please refer back to the [employer] handouts supplied to you in the travel brochure folder that we provided to you at your visit.  These are updated daily, if necessary, by the reporting agencies, so are a valuable source of information for your trip.    This brief summary may not contain all of the items that we discussed today.  Please review the handouts given to you as well.    ** Please be sure to carry any medications with you in your carry-on luggage in the event that your luggage is delayed or lost during your travels.    ** For your trip, as we discussed, standard food and water precautions apply.     You should avoid drinking any water that is not bottled.  Alcoholic or carbonated beverages are safe to drink.  Any beverage that has been brought to a rolling boil for  3 minutes is also safe to drink (once it has cooled some).  Commercially purchased milk products that are boxed (may be on store shelves) or refrigerated, are pasteurized and therefore safe to drink as long as they are refrigerated after opening.  Juices that are prepared commercially (in boxes or individual bottles) are also safe to drink as they are pasteurized as well.  Avoid road-side juice vendors as the juice may be diluted with contaminated water or produced from unclean fruit.  Regarding food precautions, you want to make sure that meats are well cooked.   Avoid eating fresh fruits and vegetables raw with the skin intact.  Peel before eating.  Avoid salads due to possible contamination by contaminated water.  Avoid any unpasteurized cheeses (they typically are very white in appearance)    ** We recommend that you use insect repellant to help you prevent infections caused by biting insects such as mosquitoes, flies, ticks, fleas and chiggers.  This includes protection against Zika virus, Dengue virus, Chikungunya and Malaria, just to name a few.    For insect  "repellents that are applied directly to the skin, we recommend DEET containing repellants with a percentage between 20-40%.  Apply to skin exposed surfaces only, every 6-8 hours throughout the day.  I typically recommend applying before breakfast, lunch and dinner as these are natural breaks in your day.  Wash your hands after applying. Apply again in the evening.  You must reapply after bathing or swimming as it is washed away with water.  Apply after sunscreen products are applied. DEET containing repellants protect against all concerning insects with the exception of hornets, wasps or bees. Activity against ticks only lasts for 2 hours. For additional Tick precautions while hiking, tuck your pant legs into your socks as this will prevent ticks from crawling up your shoes / socks onto your legs while walking. Perform a \"tick check\" at least once daily, at the end of your day.  Check in the sporting goods areas of most stores for these products.  A recommended alternative, Picardin ,may also be used every 8 hours.  If you are unable to locate the product in stores, try on-line stores as well.      PERMETHRIN SPRAY  is an additional option and is for application to clothing and garments only.  This can be applied to hats, bandanas, socks, boots and any clothing items to prevent insect attention.  I can be applied before you leave and will remain active through many washes and for up to 6 weeks in unwashed clothing.  Read any packaging for full specifications. Apply in an open area as when wet, it has an odor. Once dry, it typically has no odor and does not stain clothing. Regular repellants should be applied to exposed skin however.  Permethrin may only be available on-line.     **  As a general safety procedure, we recommend that you scan a copy of your passport, any travel required documents (yellow fever vaccine card), and itinerary and email them to yourself.  Keep these in a special travel folder for reference " in the event that your travel documents are misplaced or stolen while abroad.  You should also leave a detailed copy of your itinerary with a friend or relative at home for reference as well.  If traveling for long periods, an international SIM card or global plan for your cellular service may be beneficial for communication. This list is not meant to be all inclusive.      **  Please review and understand any cultural aspects of the countries that you will be visiting to ensure that appropriate dress and behaviors are understood.  ENJOY YOUR TRIP (o:      **  Make sure to come back to complete any vaccine series that may have been started today.  This will ensure full vaccine efficacy and protection for future travel.    Today, you received a Hepatitis A vaccine, which will protect you from Hepatitis A for your trip, and the oral Typhoid vaccine, which is good for 5 years.  Keep the Typhoid pills in the refrigerator until you take them.  Start them anytime between now and three weeks before your trip.  Take a pill on an empty stomach on  day 1, skip a day, pill on day 3, skip a day, pill on day 5, skip a day, pill on day 7. I sent prescriptions for malaria chemoprophylaxis and for ciprofloxacin for Traveler's diarrhea to your pharmacy.  Enjoy your trip!

## 2025-05-15 ENCOUNTER — OFFICE VISIT (OUTPATIENT)
Dept: SLEEP MEDICINE | Facility: CLINIC | Age: 45
End: 2025-05-15
Payer: COMMERCIAL

## 2025-05-15 VITALS
HEART RATE: 74 BPM | RESPIRATION RATE: 16 BRPM | OXYGEN SATURATION: 100 % | WEIGHT: 129 LBS | TEMPERATURE: 98.4 F | SYSTOLIC BLOOD PRESSURE: 125 MMHG | HEIGHT: 64 IN | BODY MASS INDEX: 22.02 KG/M2 | DIASTOLIC BLOOD PRESSURE: 80 MMHG

## 2025-05-15 DIAGNOSIS — G47.419 NARCOLEPSY WITHOUT CATAPLEXY (HHS-HCC): Primary | ICD-10-CM

## 2025-05-15 DIAGNOSIS — Z79.899 MEDICATION MANAGEMENT: ICD-10-CM

## 2025-05-15 PROCEDURE — 1036F TOBACCO NON-USER: CPT | Performed by: INTERNAL MEDICINE

## 2025-05-15 PROCEDURE — 99214 OFFICE O/P EST MOD 30 MIN: CPT | Performed by: INTERNAL MEDICINE

## 2025-05-15 PROCEDURE — 3008F BODY MASS INDEX DOCD: CPT | Performed by: INTERNAL MEDICINE

## 2025-05-15 ASSESSMENT — SLEEP AND FATIGUE QUESTIONNAIRES
HOW LIKELY ARE YOU TO NOD OFF OR FALL ASLEEP WHILE SITTING AND READING: SLIGHT CHANCE OF DOZING
HOW LIKELY ARE YOU TO NOD OFF OR FALL ASLEEP WHEN YOU ARE A PASSENGER IN A CAR FOR AN HOUR WITHOUT A BREAK: SLIGHT CHANCE OF DOZING
HOW LIKELY ARE YOU TO NOD OFF OR FALL ASLEEP WHILE SITTING QUIETLY AFTER LUNCH WITHOUT ALCOHOL: SLIGHT CHANCE OF DOZING
SITING INACTIVE IN A PUBLIC PLACE LIKE A CLASS ROOM OR A MOVIE THEATER: SLIGHT CHANCE OF DOZING
HOW LIKELY ARE YOU TO NOD OFF OR FALL ASLEEP WHILE SITTING AND TALKING TO SOMEONE: WOULD NEVER DOZE
HOW LIKELY ARE YOU TO NOD OFF OR FALL ASLEEP IN A CAR, WHILE STOPPED FOR A FEW MINUTES IN TRAFFIC: WOULD NEVER DOZE
HOW LIKELY ARE YOU TO NOD OFF OR FALL ASLEEP WHILE WATCHING TV: WOULD NEVER DOZE
HOW LIKELY ARE YOU TO NOD OFF OR FALL ASLEEP WHILE LYING DOWN TO REST IN THE AFTERNOON WHEN CIRCUMSTANCES PERMIT: MODERATE CHANCE OF DOZING
ESS-CHAD TOTAL SCORE: 6

## 2025-05-15 NOTE — PROGRESS NOTES
[facility]  FOLLOW-UP VISIT NOTE      HISTORY OF PRESENT ILLNESS     CONTROLLED SUBSTANCE HPI  Current medications: Xyrem  Any side effects: from current meds: No     OARRS:   I have personally reviewed the OARRS report for Bouchra Burton. I have considered the risks of abuse, dependence, addiction and diversion and I believe that it is clinically appropriate for Bouchra Burton to be prescribed this medication    Is the patient prescribed a combination of benzodiazepine and opioid? No    Date of last urine drug screen: 1/11/2024   Results of last urine drug screen: Results are as expected.   Ordered urine drug screen today: Yes     Controlled substance agreement:   Date of the last agreement: 1/11/2024  Renewed controlled substance agreement today: Yes   Reviewed Controlled Substance Agreement including but not limited to the benefits, risks, and alternatives to treatment with a Controlled Substance medication(s).    Stimulants:   What is the patient's goal of therapy? Stay awake in daytime  Is this being achieved with current treatment? yes    Activities of Daily Living:   Is your overall impression that this patient is benefiting (symptom reduction outweighs side effects) from stimulant therapy? Yes     1. Physical Functioning: Better  2. Family Relationship: Better  3. Social Relationship: Better  4. Mood: Better  5. Sleep Patterns: Better  6. Overall Function: Better      Patient ID: Bouchra Burton is a 44 y.o. female who presents to a [facility] for follow-up of narcolepsy without cataplexy on Xyrem.     Patient is here alone today.  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     Interval History  Patient was last seen in 5/2/2024. Plan was to continue Xyrem  Since last visit, patient was able to stay awake and be alert in daytime.    Still denies cataplexy, sleep paralysis, and hypnagogic / hypnopompic hallucinations.  Doing well on Xyrem and denies side effects from medication.     ROSA Follow-up:  Declined PAP therapy. Tried myofunctional therapy which does not seem to be effective per patient. However, she states that non-supine sleep seemed to have improved her snoring and ROSA symptoms     RLS Follow-up:   Denies    SLEEP STUDY HISTORY (personally reviewed raw data such as interpretation report, data sheet, hypnogram, and titration table if available and applicable)  PSG 2/16/2004: AHI 4.9, REM AHI 10, SpO2 lobito 91%, sleep latency 9.5 minutes, REM latency 100.5 mins, sleep efficiency 95.5%, PLM index 1.1, no snoring, BMI 23  MSLT 2/17/2004: MSL 0.4 mins, 5 SOREMPs out of 5 naps   PSG 2/12/2020: AHI3% 16.6, AHI4% 7.4, SpO2 lobito 89%    SLEEP-WAKE SCHEDULE  Bedtime: 11 PM daily  Subjective sleep latency: 1 hour due to reading. If feeling tired, falls asleep faster.  Number of awakenings: She wakes up once at 3:30 AM to take 2nd dose Xyrem.  Falls back asleep right away  Final wake time: 7:30 AM daily   Out of bed time: 7:30 AM on weekdays, 8:30 AM on weekends  Shift work: no  Naps: no  Average sleep duration (excluding naps): 7-8 hours    SLEEP ENVIRONMENT  Sleep location: bed  Sleep status: sleeps with   Preferred sleep position: back and side    SLEEP HABITS   Smoking: no  ETOH: no  Marijuana: no  Caffeine: may sometimes drink herbal tea  Sleep aids: no    WEIGHT: stable    REVIEW OF SYSTEMS     All other systems have been reviewed and are negative.    ALLERGIES     Allergies[1]    MEDICATIONS     Current Medications[2]    Active Problems, Allergy List, Medication List, and PMH/PSH/FH/Social Hx have been reviewed and reconciled in chart. No significant changes unless documented in the pertinent chart section. Updates made when necessary.     PHYSICAL EXAM     VITAL SIGNS: /80   Pulse 74   Temp 36.9 °C (98.4 °F)   Resp 16    "Ht 1.626 m (5' 4\")   Wt 58.5 kg (129 lb)   SpO2 100%   BMI 22.14 kg/m²     Today ESS: 6   Last visit ESS: 6  DANIELA: 3    Physical Exam  Constitutional: Awake, not in distress  Lungs: Clear to auscultation bilateral, no rales  Heart: Regular rate and rhythm, no murmurs  Skin: Warm, no rash  Neuro: No tremors, moves all extremities  Psych: alert and oriented to time, place, and person    RESULTS/DATA     No results found for: \"IRON\", \"TRANSFERRIN\", \"IRONSAT\", \"TIBC\", \"FERRITIN\"    No results found for: \"CO2\"    ASSESSMENT/PLAN     Bouchra Burton is a 44 y.o. female who returns in [facility] for the following problems:    NARCOLEPSY without CATAPLEXY  - Doing well on Xyrem. Patient denies any side effects.   - Advised patient to monitor BP at least once daily while on stimulants with goal BP<140/90  - OARRS reviewed today which showed no signs of abuse.  - Controlled substance agreement completed last 1/11/2024. Controlled substance agreement renewed and signed today in clinic.  - Urine drug screen completed last 1/11/2024 which showed results as expected. Urine drug screen ordered today.  - Continue Xyrem 4.5g 2x nightly  - Discussed that most medications for narcolepsy are contraindicated during pregnancy and lactation. Advised to call office if she gets pregnant.   - Supportive management:   Plan naps to control daytime sleepiness and reduce the number of unplanned sudden sleep attacks. Schedule a brief nap (10-15 minutes) in strategic times during the day, if possible.   Inform teachers or work supervisors about the condition.   Maintain a regular sleep-wake schedule and practice good sleep hygiene.   Expose self to bright light in daytime.   Exercise daily in daytime as it can beneficial and STIMULATING.   Avoid alcohol and sedative-hypnotic drugs as these substances may exacerbate sleepiness.   Avoid driving vehicle or operating heavy machinery when sleepy until daytime " sleepiness is well-treated. A person driving while sleepy is 5 times more likely to have an accident. If you feel sleepy, pull over and take a short power nap (sleep for less than 30 minutes). Otherwise, ask somebody to drive you.     OBSTRUCTIVE SLEEP APNEA, MODERATE positional (PSG AHI3% 16.6, AHI4% 7.4)  - Does not feel that myofunctional therapy is helping.   - States that positional therapy actually improved her snoring and ROSA symptoms  - Continue supportive management as follows: Lose weight. Stay off your back when sleeping. Avoid smoking, alcohol, and sedating medications if applicable. Don't drive when sleepy.    SEASONAL ALLERGIES   - Consider fluticasone nasal spray 2 sprays per nostril once daily 1 hour before bedtime.  - If there is inadequate or lack of improvement on the above intranasal steroid spray, consider adding Azelastine nasal spray, 2 sprays per nostril once daily in the morning.   - Alternatively, may add cetirizine or loratadine 10 mg once daily.       All of patient's questions were answered. She verbalizes understanding and agreement with my assessment and plan. Refer to after-visit-summary (AVS) for patient education and if applicable, for more details on troubleshooting issues with PAP usage, proper cleaning instructions of PAP supplies, and usual recommended replacement schedule for each of the PAP supplies.     Follow-up in 6 months.         [1] No Known Allergies  [2]   Current Outpatient Medications   Medication Sig Dispense Refill    atovaquone-proguaniL (Malarone) 250-100 mg tablet Take 1 tablet by mouth once daily for 26 days. Begin taking daily, 1-2 days before entering malaria region. Take at same time daily while there and for 7 days after leaving malaria region. Do not allow more than 24 hours between doses 26 tablet 0    ciprofloxacin (Cipro) 500 mg tablet Take 1 tablet (500 mg) by mouth 2 times a day for 3 days. Begin one tablet twice daily at onset of disruptive diarrhea  6 tablet 0    cycloSPORINE (Restasis) 0.05 % ophthalmic emulsion Administer 1 drop into affected eye(s) twice a day.      sodium oxybate 500 mg/mL solution Take 1 mL (500 mg) by mouth.      spironolactone (Aldactone) 25 mg tablet Take 1 tablet (25 mg) by mouth once daily.       No current facility-administered medications for this visit.

## 2025-05-16 DIAGNOSIS — G47.419 NARCOLEPSY WITHOUT CATAPLEXY (HHS-HCC): ICD-10-CM

## 2025-05-16 LAB
AMPHETAMINES UR QL: NEGATIVE NG/ML
BARBITURATES UR QL: NEGATIVE NG/ML
BENZODIAZ UR QL: NEGATIVE NG/ML
BZE UR QL: NEGATIVE NG/ML
CREAT UR-MCNC: 144.2 MG/DL
FENTANYL UR QL SCN: NEGATIVE NG/ML
METHADONE UR QL: NEGATIVE NG/ML
OPIATES UR QL: NEGATIVE NG/ML
OXIDANTS UR QL: NEGATIVE MCG/ML
OXYCODONE UR QL: NEGATIVE NG/ML
PCP UR QL: NEGATIVE NG/ML
PH UR: 8.7 [PH] (ref 4.5–9)
QUEST NOTES AND COMMENTS: NORMAL
THC UR QL: NEGATIVE NG/ML

## 2025-05-16 RX ORDER — SODIUM OXYBATE 0.5 G/ML
SOLUTION ORAL
Qty: 540 ML | Refills: 5 | OUTPATIENT
Start: 2025-05-16

## 2025-05-16 NOTE — TELEPHONE ENCOUNTER
Called [facility] pharmacy to refill of patient Sodium Oxybate (generic Xyrem), Take 4.5 g at bedtime and 4.5 g 2.5-4 hrs later for a total maximum nightly dose of 9 grams.      Spoke with pharmacist Juan Manuel and was refilled for 6 month per Dr. Conner.     Patient is scheduled for follow up with Dr. Conner on 110/30/2025.